# Patient Record
(demographics unavailable — no encounter records)

---

## 2024-10-29 NOTE — ASSESSMENT
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 If still active switch to tacrolimus 0.03% ointment BID. SED, advised mixing tacrolimus with plain Vaseline to minimize irritation  - C/w mometasone 0.1% ointment BID to AAs on the body -- topical steroid SED including atrophy, dyspigmentation, telangiectasias, striae. Proper use reviewed including only using to affected area and avoidance of prolonged use encouraged to avoid all fragrance (perfume, candles, skincare products)  # Acne vulgaris, mod, inflammatory and comedonal, chronic, flaring  - Diagnosis and course of condition discussed  - Reviewed expected time course of improving on topical regimen (can take 6-8 weeks for earliest signs of improvement; 3-6 months should reach maximum anticipated improvement)  - Start Cerave acne cream foaming cleanser with 4% BPO qAM  - START Doxycycline 100mg BID, taken with food. Discussed proper use and possible associated adverse effects, including GI distress, photosensitivity, and hypersensitivity reaction. - Start Tretinoin 0.05% crm at bedtime. Apply pea size amt spread thinly over entire face, 30 min after washing face, every other night for 2 wks, then advance to qhs as tolerated. SED.  - Discussed liberal use of non-comedogenic moisturizers - Advised against skin picking behaviors as this can worsen scarring and post-inflammatory hyperpigmentation   RTC 3 mo

## 2024-10-29 NOTE — HISTORY OF PRESENT ILLNESS
[FreeTextEntry1] : RPV acne, eczema [de-identified] : ROSALBA ARNETT is a 15-year-old F here for evaluation of below, here with mom  acne on face x years, does not flare with periods, has regular periods, not on birth control. no attempted treatments   eczema on face, neck, AC fossa, popliteal fossa x ~1y, since LV using hydrocortisone on face and mometasone on body with improvement. felt tacrolimus stung when applied

## 2024-10-29 NOTE — HISTORY OF PRESENT ILLNESS
[FreeTextEntry1] : RPV acne, eczema [de-identified] : ROSALBA ARNETT is a 15-year-old F here for evaluation of below, here with mom  acne on face x years, does not flare with periods, has regular periods, not on birth control. no attempted treatments   eczema on face, neck, AC fossa, popliteal fossa x ~1y, since LV using hydrocortisone on face and mometasone on body with improvement. felt tacrolimus stung when applied

## 2024-10-29 NOTE — PHYSICAL EXAM
[Alert] : alert [Oriented x 3] : ~L oriented x 3 [Declined] : declined [FreeTextEntry3] : few inflammatory papules, several scattered comedones admixed with hyperpigmented macules scattered on face xerosis cutis